# Patient Record
Sex: FEMALE | Race: WHITE | NOT HISPANIC OR LATINO | Employment: FULL TIME | URBAN - METROPOLITAN AREA
[De-identification: names, ages, dates, MRNs, and addresses within clinical notes are randomized per-mention and may not be internally consistent; named-entity substitution may affect disease eponyms.]

---
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
## 2020-02-21 ENCOUNTER — TELEPHONE (OUTPATIENT)
Dept: OBGYN | Facility: CLINIC | Age: 20
End: 2020-02-21

## 2020-02-21 DIAGNOSIS — N94.6 DYSMENORRHEA: ICD-10-CM

## 2020-02-21 RX ORDER — NORETHINDRONE ACETATE AND ETHINYL ESTRADIOL 1.5-30(21)
KIT ORAL
Qty: 112 TABLET | Refills: 0 | OUTPATIENT
Start: 2020-02-21

## 2020-02-21 NOTE — TELEPHONE ENCOUNTER
"Requested Prescriptions   Pending Prescriptions Disp Refills     norethindrone-ethinyl estradiol-iron (JUNEL FE 1.5/30) 1.5-30 MG-MCG PO tablet [Pharmacy Med Name: JUNEL FE 1.5 MG-30 MCG TABLET] 112 tablet 0     Sig: TAKE 1 TABLET BY MOUTH ONCE DAILY IN A CONTINUOUS FASHION       Contraceptives Protocol Passed - 2/21/2020  3:27 PM        Passed - Patient is not a current smoker if age is 35 or older        Passed - Recent (12 mo) or future (30 days) visit within the authorizing provider's specialty     Patient has had an office visit with the authorizing provider or a provider within the authorizing providers department within the previous 12 mos or has a future within next 30 days. See \"Patient Info\" tab in inbasket, or \"Choose Columns\" in Meds & Orders section of the refill encounter.              Passed - Medication is active on med list        Passed - No active pregnancy on record        Passed - No positive pregnancy test in past 12 months        Refills available  Noreen Castro RN on 2/21/2020 at 3:36 PM    "

## 2020-02-21 NOTE — TELEPHONE ENCOUNTER
Patient called with questions regarding her birth control medication refill, says it did not go through to the pharmacy.

## 2020-02-24 NOTE — TELEPHONE ENCOUNTER
She said that the pharmacy told her she did not have refills.  Will call and clarify and she should be able to pick them up today.  Pt verbalized understanding, in agreement with plan, and voiced no further questions.   Pharmacy called.  She has refills available.  Tayla Sanchez RN on 2/24/2020 at 11:36 AM

## 2020-03-25 DIAGNOSIS — N94.6 DYSMENORRHEA: ICD-10-CM

## 2020-03-25 RX ORDER — NORETHINDRONE ACETATE AND ETHINYL ESTRADIOL 1.5-30(21)
KIT ORAL
Qty: 112 TABLET | Refills: 2 | Status: SHIPPED | OUTPATIENT
Start: 2020-03-25 | End: 2020-12-22

## 2020-03-25 NOTE — TELEPHONE ENCOUNTER
"Requested Prescriptions   Pending Prescriptions Disp Refills     norethindrone-ethinyl estradiol-iron (JUNEL FE 1.5/30) 1.5-30 MG-MCG tablet 112 tablet 0     Sig: TAKE 1 TABLET BY MOUTH ONCE DAILY IN A CONTINUOUS FASHION       Contraceptives Protocol Passed - 3/25/2020 12:19 PM        Passed - Patient is not a current smoker if age is 35 or older        Passed - Recent (12 mo) or future (30 days) visit within the authorizing provider's specialty     Patient has had an office visit with the authorizing provider or a provider within the authorizing providers department within the previous 12 mos or has a future within next 30 days. See \"Patient Info\" tab in inbasket, or \"Choose Columns\" in Meds & Orders section of the refill encounter.              Passed - Medication is active on med list        Passed - No active pregnancy on record        Passed - No positive pregnancy test in past 12 months           Last Written Prescription Date:  11/26/19  Last Fill Quantity: 112,  # refills: 3   Last office visit: 11/26/2019 with prescribing provider:  hector     Requested from different pharmacy.  Prescription approved per [facility] Refill Protocol.  Tayla Sanchez RN on 3/25/2020 at 12:45 PM      "

## 2020-12-21 NOTE — PROGRESS NOTES
Shena is a 20 year old  female who presents for annual exam.     Besides routine health maintenance, she has no other health concerns today .    HPI:  The patient's PCP is [facility].   Refill her continuous ocp  College in [place]  Online this years due to covid  Loves not having periods  Uses condoms also  Had hpv vaccine she said  Had covid already about 3 months ago  First pap done today      GYNECOLOGIC HISTORY:    Patient's last menstrual period was 2020 (exact date).    Regular menses? On continuous birth control, menses every 3 months      Her current contraception method is: oral contraceptives.  She  reports that she has never smoked. She has never used smokeless tobacco.    Patient is sexually active.  STD testing offered?  Declined  Last PHQ-9 score on record =   PHQ-9 SCORE 2020   PHQ-9 Total Score 0     Last GAD7 score on record =   ISABELLE-7 SCORE 2020   Total Score 1     Alcohol Score = 4    HEALTH MAINTENANCE:  Cholesterol: (No results found for: CHOL   Last Mammo: Not applicable, Result: Not applicable, Next Mammo: Due at age 40   Pap: (No results found for: PAP )  Colonoscopy:  NA, Result: Not applicable, Next Colonoscopy: NA years.  Dexa:  NA    Health maintenance updated:  yes    HISTORY:  OB History    Para Term  AB Living   0 0 0 0 0 0   SAB TAB Ectopic Multiple Live Births   0 0 0 0 0       Patient Active Problem List   Diagnosis     Bradycardia     Past Surgical History:   Procedure Laterality Date     HC TOOTH EXTRACTION W/FORCEP        Social History     Tobacco Use     Smoking status: Never Smoker     Smokeless tobacco: Never Used   Substance Use Topics     Alcohol use: Yes     Alcohol/week: 0.0 standard drinks     Comment: Occas       Problem (# of Occurrences) Relation (Name,Age of Onset)    No Known Problems (8) Mother, Father, Sister, Brother, Maternal Grandmother, Maternal Grandfather, Paternal Grandmother, Other         "    Current Outpatient Medications   Medication Sig     fluticasone-vilanterol (BREO ELLIPTA) 200-25 MCG/INH inhaler Inhale 1 puff into the lungs daily     montelukast (SINGULAIR) 10 MG tablet      norethindrone-ethinyl estradiol-iron (JUNEL FE 1.5/30) 1.5-30 MG-MCG tablet TAKE 1 TABLET BY MOUTH ONCE DAILY IN A CONTINUOUS FASHION     No current facility-administered medications for this visit.      No Known Allergies    Past medical, surgical, social and family histories were reviewed and updated in EPIC.    ROS:   12 point review of systems negative other than symptoms noted below or in the HPI.  No urinary frequency or dysuria, bladder or kidney problems    EXAM:  /60   Ht 1.708 m (5' 7.25\")   Wt 62.1 kg (137 lb)   LMP 12/09/2020 (Exact Date)   Breastfeeding No   BMI 21.30 kg/m     BMI: Body mass index is 21.3 kg/m .    PHYSICAL EXAM:  Constitutional:   Appearance: Well nourished, well developed, alert, in no acute distress  Neck:  Lymph Nodes:  No lymphadenopathy present    Thyroid:  Gland size normal, nontender, no nodules or masses present  on palpation    Breasts: Inspection of Breasts:  No lymphadenopathy present., Palpation of Breasts and Axillae:  No masses present on palpation, no breast tenderness., Axillary Lymph Nodes:  No lymphadenopathy present. and No nodularity, asymmetry or nipple discharge bilaterally.  Gastrointestinal:   Abdominal Examination:  Abdomen nontender to palpation, tone normal without rigidity or guarding, no masses present, umbilicus without lesions   Liver and Spleen:  No hepatomegaly present, liver nontender to palpation    Hernias:  No hernias present  Lymphatic: Lymph Nodes:  No other lymphadenopathy present  Skin:  General Inspection:  No rashes present, no lesions present, no areas of  discoloration  Neurologic:    Mental Status:  Oriented X3.  Normal strength and tone, sensory exam                grossly normal, mentation intact and speech normal.    Psychiatric:   " Mentation appears normal and affect normal/bright.         Pelvic Exam:  External Genitalia:     Normal appearance for age, no discharge present, no tenderness present, no inflammatory lesions present, color normal  Vagina:     Normal vaginal vault without central or paravaginal defects, no discharge present, no inflammatory lesions present, no masses present  Bladder:     Nontender to palpation  Urethra:   Urethral Body:  Urethra palpation normal, urethra structural support normal   Urethral Meatus:  No erythema or lesions present  Cervix:     Appearance healthy, no lesions present, nontender to palpation, no bleeding present  Uterus:     Uterus: firm, normal sized and nontender, midplane in position.   Adnexa:     No adnexal tenderness present, no adnexal masses present  Perineum:     Perineum within normal limits, no evidence of trauma, no rashes or skin lesions present  Anus:     Anus within normal limits, no hemorrhoids present  Inguinal Lymph Nodes:     No lymphadenopathy present  Pubic Hair:     Normal pubic hair distribution for age  Genitalia and Groin:     No rashes present, no lesions present, no areas of discoloration, no masses present      COUNSELING:   Reviewed preventive health counseling, as reflected in patient instructions       Safe sex practices/STD prevention    BMI: Body mass index is 21.3 kg/m .      ASSESSMENT:  20 year old female with satisfactory annual exam.    ICD-10-CM    1. Encounter for gynecological examination without abnormal finding  Z01.419 Pap imaged thin layer screen only - recommended age 21 - 24 years   2. Dysmenorrhea  N94.6 norethindrone-ethinyl estradiol-iron (JUNEL FE 1.5/30) 1.5-30 MG-MCG tablet       PLAN:  Refilled ocp  Discussed pap guidelines  Discussed vit D as a good supplement   Repeat pap 3 yrs if normal    Zeenat Gerardo MD

## 2020-12-22 ENCOUNTER — OFFICE VISIT (OUTPATIENT)
Dept: OBGYN | Facility: CLINIC | Age: 20
End: 2020-12-22
Payer: COMMERCIAL

## 2020-12-22 VITALS
SYSTOLIC BLOOD PRESSURE: 120 MMHG | BODY MASS INDEX: 21.5 KG/M2 | HEIGHT: 67 IN | WEIGHT: 137 LBS | DIASTOLIC BLOOD PRESSURE: 60 MMHG

## 2020-12-22 DIAGNOSIS — Z01.419 ENCOUNTER FOR GYNECOLOGICAL EXAMINATION WITHOUT ABNORMAL FINDING: Primary | ICD-10-CM

## 2020-12-22 DIAGNOSIS — N94.6 DYSMENORRHEA: ICD-10-CM

## 2020-12-22 PROCEDURE — 99395 PREV VISIT EST AGE 18-39: CPT | Performed by: OBSTETRICS & GYNECOLOGY

## 2020-12-22 PROCEDURE — G0145 SCR C/V CYTO,THINLAYER,RESCR: HCPCS | Performed by: OBSTETRICS & GYNECOLOGY

## 2020-12-22 RX ORDER — NORETHINDRONE ACETATE AND ETHINYL ESTRADIOL 1.5-30(21)
KIT ORAL
Qty: 112 TABLET | Refills: 4 | Status: SHIPPED | OUTPATIENT
Start: 2020-12-22 | End: 2021-02-15

## 2020-12-22 ASSESSMENT — ANXIETY QUESTIONNAIRES
5. BEING SO RESTLESS THAT IT IS HARD TO SIT STILL: NOT AT ALL
3. WORRYING TOO MUCH ABOUT DIFFERENT THINGS: SEVERAL DAYS
IF YOU CHECKED OFF ANY PROBLEMS ON THIS QUESTIONNAIRE, HOW DIFFICULT HAVE THESE PROBLEMS MADE IT FOR YOU TO DO YOUR WORK, TAKE CARE OF THINGS AT HOME, OR GET ALONG WITH OTHER PEOPLE: NOT DIFFICULT AT ALL
7. FEELING AFRAID AS IF SOMETHING AWFUL MIGHT HAPPEN: NOT AT ALL
1. FEELING NERVOUS, ANXIOUS, OR ON EDGE: NOT AT ALL
GAD7 TOTAL SCORE: 1
6. BECOMING EASILY ANNOYED OR IRRITABLE: NOT AT ALL
2. NOT BEING ABLE TO STOP OR CONTROL WORRYING: NOT AT ALL

## 2020-12-22 ASSESSMENT — MIFFLIN-ST. JEOR: SCORE: 1428.02

## 2020-12-22 ASSESSMENT — PATIENT HEALTH QUESTIONNAIRE - PHQ9
SUM OF ALL RESPONSES TO PHQ QUESTIONS 1-9: 0
5. POOR APPETITE OR OVEREATING: NOT AT ALL

## 2020-12-23 ASSESSMENT — ANXIETY QUESTIONNAIRES: GAD7 TOTAL SCORE: 1

## 2020-12-28 LAB
COPATH REPORT: NORMAL
PAP: NORMAL

## 2021-01-15 ENCOUNTER — HEALTH MAINTENANCE LETTER (OUTPATIENT)
Age: 21
End: 2021-01-15

## 2021-02-15 DIAGNOSIS — N94.6 DYSMENORRHEA: ICD-10-CM

## 2021-02-15 RX ORDER — NORETHINDRONE ACETATE AND ETHINYL ESTRADIOL 1.5-30(21)
KIT ORAL
Qty: 112 TABLET | Refills: 4 | Status: SHIPPED | OUTPATIENT
Start: 2021-02-15 | End: 2022-01-14

## 2021-02-15 NOTE — TELEPHONE ENCOUNTER
"Requested Prescriptions   Pending Prescriptions Disp Refills     norethindrone-ethinyl estradiol-iron (JUNEL FE 1.5/30) 1.5-30 MG-MCG tablet 112 tablet 4     Sig: TAKE 1 TABLET BY MOUTH ONCE DAILY IN A CONTINUOUS FASHION       Contraceptives Protocol Passed - 2/15/2021  9:44 AM        Passed - Patient is not a current smoker if age is 35 or older        Passed - Recent (12 mo) or future (30 days) visit within the authorizing provider's specialty     Patient has had an office visit with the authorizing provider or a provider within the authorizing providers department within the previous 12 mos or has a future within next 30 days. See \"Patient Info\" tab in inbasket, or \"Choose Columns\" in Meds & Orders section of the refill encounter.              Passed - Medication is active on med list        Passed - No active pregnancy on record        Passed - No positive pregnancy test in past 12 months           Last Written Prescription Date:  12/22/20  Last Fill Quantity: 112,  # refills: 4   Last office visit: 12/22/2020 with prescribing provider:  Akin   Future Office Visit:  None found    Rx sent to preferred pharmacy  Noreen Castro RN on 2/15/2021 at 9:51 AM        "

## 2021-09-18 ENCOUNTER — HEALTH MAINTENANCE LETTER (OUTPATIENT)
Age: 21
End: 2021-09-18

## 2022-01-14 ENCOUNTER — TELEPHONE (OUTPATIENT)
Dept: OBGYN | Facility: CLINIC | Age: 22
End: 2022-01-14
Payer: COMMERCIAL

## 2022-01-14 DIAGNOSIS — N94.6 DYSMENORRHEA: ICD-10-CM

## 2022-01-14 RX ORDER — NORETHINDRONE ACETATE AND ETHINYL ESTRADIOL 1.5-30(21)
KIT ORAL
Qty: 112 TABLET | Refills: 1 | Status: SHIPPED | OUTPATIENT
Start: 2022-01-14 | End: 2022-05-13

## 2022-01-14 NOTE — TELEPHONE ENCOUNTER
Requested Prescriptions   Signed Prescriptions Disp Refills    norethindrone-ethinyl estradiol-iron (JUNEL FE 1.5/30) 1.5-30 MG-MCG tablet 112 tablet 1     Sig: TAKE 1 TABLET BY MOUTH ONCE DAILY IN A CONTINUOUS FASHION       There is no refill protocol information for this order        Last Written Prescription Date:  2/7/2021  Last Fill Quantity: 112,  # refills: 4   Last office visit: 12/22/2020 with prescribing provider:  Dr Gerardo   Future Office Visit: with Dr levy 5/13/2022    Prescription approved per [facility] Refill Protocol.  Anabell Mckeon RN on 1/14/2022 at 3:06 PM

## 2022-03-05 ENCOUNTER — HEALTH MAINTENANCE LETTER (OUTPATIENT)
Age: 22
End: 2022-03-05

## 2022-05-13 ENCOUNTER — OFFICE VISIT (OUTPATIENT)
Dept: OBGYN | Facility: CLINIC | Age: 22
End: 2022-05-13
Payer: COMMERCIAL

## 2022-05-13 VITALS
BODY MASS INDEX: 22.98 KG/M2 | SYSTOLIC BLOOD PRESSURE: 122 MMHG | WEIGHT: 143 LBS | DIASTOLIC BLOOD PRESSURE: 62 MMHG | HEIGHT: 66 IN

## 2022-05-13 DIAGNOSIS — Z01.419 ENCOUNTER FOR GYNECOLOGICAL EXAMINATION WITHOUT ABNORMAL FINDING: Primary | ICD-10-CM

## 2022-05-13 DIAGNOSIS — N94.6 DYSMENORRHEA: ICD-10-CM

## 2022-05-13 PROCEDURE — 99395 PREV VISIT EST AGE 18-39: CPT | Performed by: NURSE PRACTITIONER

## 2022-05-13 RX ORDER — NORETHINDRONE ACETATE AND ETHINYL ESTRADIOL 1.5-30(21)
KIT ORAL
Qty: 112 TABLET | Refills: 3 | Status: SHIPPED | OUTPATIENT
Start: 2022-05-13 | End: 2023-05-15

## 2022-05-13 NOTE — PROGRESS NOTES
Shena is a 22 year old  female who presents for annual exam.     Besides routine health maintenance, she has no other health concerns today .    HPI:  The patient's PCP is  [facility].  Patient here today for her annual GYN exam and birth control refill.  She is feeling well and has no GYN complaints.  She had a Pap smear that was negative in .  She is sexually active with 1 male partner and declines STD testing at this time.    She takes her contraceptive tablets in a continuous fashion and is amenorrheic on the method.      GYNECOLOGIC HISTORY:    No LMP recorded. (Menstrual status: Birth Control).  Regular menses? no  Her current contraception method is: oral contraceptives.  She  reports that she has never smoked. She has never used smokeless tobacco.  Patient is sexually active.  STD testing offered?  yes    Last PHQ-9 score on record =   PHQ-9 SCORE 2020   PHQ-9 Total Score 0     Last GAD7 score on record =   ISABELLE-7 SCORE 2020   Total Score 1     Alcohol Score =     HEALTH MAINTENANCE:  Cholesterol: (No results found for: CHOL   Last Mammo: N/A, Result: not applicable, Next Mammo: screen age 40  Pap:   Lab Results   Component Value Date    PAP NIL 2020   Colonoscopy:  N/A, Result: not applicable, Next Colonoscopy: screen age 45  Dexa: N/A  Health maintenance updated:  yes    HISTORY:  OB History    Para Term  AB Living   0 0 0 0 0 0   SAB IAB Ectopic Multiple Live Births   0 0 0 0 0       Patient Active Problem List   Diagnosis     Bradycardia     Past Surgical History:   Procedure Laterality Date     HC TOOTH EXTRACTION W/FORCEP        Social History     Tobacco Use     Smoking status: Never Smoker     Smokeless tobacco: Never Used   Substance Use Topics     Alcohol use: Yes     Alcohol/week: 0.0 standard drinks     Comment: Occas       Problem (# of Occurrences) Relation (Name,Age of Onset)    No Known Problems (8) Mother, Father, Sister,  "Brother, Maternal Grandmother, Maternal Grandfather, Paternal Grandmother, Other            Current Outpatient Medications   Medication Sig     fluticasone-vilanterol (BREO ELLIPTA) 200-25 MCG/INH inhaler Inhale 1 puff into the lungs daily     montelukast (SINGULAIR) 10 MG tablet      norethindrone-ethinyl estradiol-iron (JUNEL FE 1.5/30) 1.5-30 MG-MCG tablet TAKE 1 TABLET BY MOUTH ONCE DAILY IN A CONTINUOUS FASHION     No current facility-administered medications for this visit.     No Known Allergies    Past medical, surgical, social and family histories were reviewed and updated in EPIC.    ROS:   12 point review of systems negative other than symptoms noted below or in the HPI.  No urinary frequency or dysuria, bladder or kidney problems    EXAM:  /62   Ht 1.676 m (5' 6\")   Wt 64.9 kg (143 lb)   BMI 23.08 kg/m     BMI: Body mass index is 23.08 kg/m .    PHYSICAL EXAM:  Constitutional:   Appearance: Well nourished, well developed, alert, in no acute distress  Neck:  Lymph Nodes:  No lymphadenopathy present    Thyroid:  Gland size normal, nontender, no nodules or masses present  on palpation  Chest:  Respiratory Effort:  Breathing unlabored  Cardiovascular:    Heart: Auscultation:  Regular rate, normal rhythm, no murmurs present  Breasts: Inspection of Breasts:  No lymphadenopathy present., Palpation of Breasts and Axillae:  No masses present on palpation, no breast tenderness., Axillary Lymph Nodes:  No lymphadenopathy present. and No nodularity, asymmetry or nipple discharge bilaterally.  Gastrointestinal:   Abdominal Examination:  Abdomen nontender to palpation, tone normal without rigidity or guarding, no masses present, umbilicus without lesions   Liver and Spleen:  No hepatomegaly present, liver nontender to palpation    Hernias:  No hernias present  Lymphatic: Lymph Nodes:  No other lymphadenopathy present  Skin:  General Inspection:  No rashes present, no lesions present, no areas of "  discoloration  Neurologic:    Mental Status:  Oriented X3.  Normal strength and tone, sensory exam                grossly normal, mentation intact and speech normal.    Psychiatric:   Mentation appears normal and affect normal/bright.         Pelvic Exam:  External Genitalia:     Normal appearance for age, no discharge present, no tenderness present, no inflammatory lesions present, color normal  Vagina:     Normal vaginal vault without central or paravaginal defects, no discharge present, no inflammatory lesions present, no masses present  Bladder:     Nontender to palpation  Urethra:   Urethral Body:  Urethra palpation normal, urethra structural support normal   Urethral Meatus:  No erythema or lesions present  Cervix:     Appearance healthy, no lesions present, nontender to palpation, no bleeding present  Uterus:     Uterus: firm, normal sized and nontender, anteverted in position.   Adnexa:     No adnexal tenderness present, no adnexal masses present  Perineum:     Perineum within normal limits, no evidence of trauma, no rashes or skin lesions present  Anus:     Anus within normal limits, no hemorrhoids present  Inguinal Lymph Nodes:     No lymphadenopathy present  Pubic Hair:     Normal pubic hair distribution for age  Genitalia and Groin:     No rashes present, no lesions present, no areas of discoloration, no masses present      COUNSELING:   Special attention given to:        Regular exercise       Healthy diet/nutrition       Contraception    BMI: Body mass index is 23.08 kg/m .      ASSESSMENT:  22 year old female with satisfactory annual exam.    ICD-10-CM    1. Encounter for gynecological examination without abnormal finding  Z01.419    2. Dysmenorrhea  N94.6 norethindrone-ethinyl estradiol-iron (JUNEL FE 1.5/30) 1.5-30 MG-MCG tablet       PLAN:  22-year-old female with a normal GYN exam.  Pap smear is up-to-date.  She is okay to continue on her oral contraceptive tablets for 1 year using them  continuously.  Pap smear will be due next year.    JUAN Johnson CNP

## 2022-06-24 ENCOUNTER — TRANSFERRED RECORDS (OUTPATIENT)
Dept: HEALTH INFORMATION MANAGEMENT | Facility: CLINIC | Age: 22
End: 2022-06-24

## 2022-07-01 DIAGNOSIS — N94.6 DYSMENORRHEA: ICD-10-CM

## 2022-07-05 NOTE — TELEPHONE ENCOUNTER
"Requested Prescriptions   Pending Prescriptions Disp Refills     norethindrone-ethinyl estradiol-iron (JUNEL FE 1.5/30) 1.5-30 MG-MCG tablet [Pharmacy Med Name: JUNEL FE 1.5 MG-30 MCG TABLET] 112 tablet 1     Sig: TAKE 1 TABLET BY MOUTH ONCE DAILY IN A CONTINUOUS FASHION       Contraceptives Protocol Passed - 7/1/2022 11:08 PM        Passed - Patient is not a current smoker if age is 35 or older        Passed - Recent (12 mo) or future (30 days) visit within the authorizing provider's specialty     Patient has had an office visit with the authorizing provider or a provider within the authorizing providers department within the previous 12 mos or has a future within next 30 days. See \"Patient Info\" tab in inbasket, or \"Choose Columns\" in Meds & Orders section of the refill encounter.              Passed - Medication is active on med list        Passed - No active pregnancy on record        Passed - No positive pregnancy test in past 12 months         Last Written Prescription Date:  5/13/22  Last Fill Quantity: 112,  # refills: 3    Last office visit: 5/13/2022 with prescribing provider:  Anabell Montoya NP -    PLAN:  22-year-old female with a normal GYN exam.  Pap smear is up-to-date.  She is okay to continue on her oral contraceptive tablets for 1 year using them continuously.  Pap smear will be due next year.  Future Office Visit:  NONE    Called and left detailed vm - request came from [place] [place] [facility] - original rx at [facility] locally - need to confirm plan or can tx Rx to [facility]     Anabell Mckeon RN on 7/5/2022 at 9:01 AM          rx closed. Patient to contact clinic regarding prescription.    Sylwia Nicholas RN on 7/18/2022 at 9:22 AM      "

## 2022-07-18 RX ORDER — NORETHINDRONE ACETATE AND ETHINYL ESTRADIOL 1.5-30(21)
KIT ORAL
Qty: 112 TABLET | Refills: 1 | OUTPATIENT
Start: 2022-07-18

## 2022-11-19 ENCOUNTER — HEALTH MAINTENANCE LETTER (OUTPATIENT)
Age: 22
End: 2022-11-19

## 2023-05-15 ENCOUNTER — TELEPHONE (OUTPATIENT)
Dept: OBGYN | Facility: CLINIC | Age: 23
End: 2023-05-15
Payer: COMMERCIAL

## 2023-05-15 DIAGNOSIS — N94.6 DYSMENORRHEA: ICD-10-CM

## 2023-05-15 NOTE — TELEPHONE ENCOUNTER
RN cannot refill per protocol.    Routing to Anabell Montoya NP to approve one month refill  Noreen Castro RN on 5/15/2023 at 2:53 PM

## 2023-05-15 NOTE — TELEPHONE ENCOUNTER
Reason for call:  Medication      If this is a refill request, has the caller requested the refill from the pharmacy already?     Pt due for annual, no refills available  Pt has scheduled annual and req refill until her appt. Pt lives in [place] and will be returning the week of 7/3 - Anabell is out of the office, pt will be seeing Boo.       Will the patient be using a [facility]? No     Name of the pharmacy and phone number for the current request:     [facility]/pharmacy #[id number] - [place], [address]  [phone]    Name of the medication requested: norethindrone-ethinyl estradiol-iron (JUNEL FE 1.5/30) 1.5-30 MG-MCG tablet    Phone number to reach patient:  [phone]    Best Time:  Anytime    Can we leave a detailed message on this number?  YES    Travel screening: Not Applicable

## 2023-05-16 RX ORDER — NORETHINDRONE ACETATE AND ETHINYL ESTRADIOL 1.5-30(21)
KIT ORAL
Qty: 112 TABLET | Refills: 0 | Status: SHIPPED | OUTPATIENT
Start: 2023-05-16 | End: 2023-07-06

## 2023-07-02 ENCOUNTER — HEALTH MAINTENANCE LETTER (OUTPATIENT)
Age: 23
End: 2023-07-02

## 2023-07-06 ENCOUNTER — OFFICE VISIT (OUTPATIENT)
Dept: MIDWIFE SERVICES | Facility: CLINIC | Age: 23
End: 2023-07-06
Payer: COMMERCIAL

## 2023-07-06 VITALS
BODY MASS INDEX: 22.85 KG/M2 | DIASTOLIC BLOOD PRESSURE: 62 MMHG | SYSTOLIC BLOOD PRESSURE: 118 MMHG | HEIGHT: 67 IN | WEIGHT: 145.6 LBS

## 2023-07-06 DIAGNOSIS — Z12.4 PAP SMEAR FOR CERVICAL CANCER SCREENING: ICD-10-CM

## 2023-07-06 DIAGNOSIS — Z01.419 WOMEN'S ANNUAL ROUTINE GYNECOLOGICAL EXAMINATION: Primary | ICD-10-CM

## 2023-07-06 DIAGNOSIS — Z30.41 ENCOUNTER FOR SURVEILLANCE OF CONTRACEPTIVE PILLS: ICD-10-CM

## 2023-07-06 PROBLEM — R00.1 BRADYCARDIA: Status: RESOLVED | Noted: 2017-04-12 | Resolved: 2023-07-06

## 2023-07-06 PROCEDURE — G0145 SCR C/V CYTO,THINLAYER,RESCR: HCPCS | Performed by: ADVANCED PRACTICE MIDWIFE

## 2023-07-06 PROCEDURE — 99395 PREV VISIT EST AGE 18-39: CPT | Performed by: ADVANCED PRACTICE MIDWIFE

## 2023-07-06 RX ORDER — NORETHINDRONE ACETATE AND ETHINYL ESTRADIOL 1.5-30(21)
KIT ORAL
Qty: 112 TABLET | Refills: 3 | Status: SHIPPED | OUTPATIENT
Start: 2023-07-06 | End: 2024-06-05

## 2023-07-06 ASSESSMENT — ANXIETY QUESTIONNAIRES
7. FEELING AFRAID AS IF SOMETHING AWFUL MIGHT HAPPEN: NOT AT ALL
1. FEELING NERVOUS, ANXIOUS, OR ON EDGE: NOT AT ALL
2. NOT BEING ABLE TO STOP OR CONTROL WORRYING: NOT AT ALL
5. BEING SO RESTLESS THAT IT IS HARD TO SIT STILL: NOT AT ALL
GAD7 TOTAL SCORE: 0
GAD7 TOTAL SCORE: 0
IF YOU CHECKED OFF ANY PROBLEMS ON THIS QUESTIONNAIRE, HOW DIFFICULT HAVE THESE PROBLEMS MADE IT FOR YOU TO DO YOUR WORK, TAKE CARE OF THINGS AT HOME, OR GET ALONG WITH OTHER PEOPLE: NOT DIFFICULT AT ALL
3. WORRYING TOO MUCH ABOUT DIFFERENT THINGS: NOT AT ALL
6. BECOMING EASILY ANNOYED OR IRRITABLE: NOT AT ALL

## 2023-07-06 ASSESSMENT — PATIENT HEALTH QUESTIONNAIRE - PHQ9
SUM OF ALL RESPONSES TO PHQ QUESTIONS 1-9: 0
5. POOR APPETITE OR OVEREATING: NOT AT ALL

## 2023-07-06 NOTE — PROGRESS NOTES
Shena is a 23 year old  female who presents for annual exam.     Besides routine health maintenance, she has no other health concerns today .    HPI:  Shena here today for her annual GYN exam and birth control refill. She is feeling well and has no GYN complaints.  She had a Pap smear that was negative in . Due for on by 23.    She was previously sexually active with 1 male partner (not at this time) and declines STD testing at this time.     She takes her contraceptive tablets in a continuous fashion and is amenorrheic on the method. When she does get a period it is normal with a light-medium amount of bleeding.       GYNECOLOGIC HISTORY:    23. (Menstrual status: Birth Control).      Her current contraception method is: oral contraceptives.  She  reports that she has never smoked. She has never used smokeless tobacco.    Patient is sexually active.  STD testing offered?  Declined  Last PHQ-9 score on record =       2023     8:10 AM   PHQ-9 SCORE   PHQ-9 Total Score 0     Last GAD7 score on record =       2023     8:10 AM   ISABELLE-7 SCORE   Total Score 0     Alcohol Score = 3    HEALTH MAINTENANCE:    Pap:   Lab Results   Component Value Date    PAP NIL 2020       Health maintenance updated:  yes    HISTORY:  OB History    Para Term  AB Living   0 0 0 0 0 0   SAB IAB Ectopic Multiple Live Births   0 0 0 0 0       Patient Active Problem List   Diagnosis     Bradycardia     Past Surgical History:   Procedure Laterality Date     HC TOOTH EXTRACTION W/FORCEP  2017      Social History     Tobacco Use     Smoking status: Never     Smokeless tobacco: Never   Substance Use Topics     Alcohol use: Yes     Alcohol/week: 0.0 standard drinks of alcohol     Comment: Occas       Problem (# of Occurrences) Relation (Name,Age of Onset)    No Known Problems (8) Mother, Father, Sister, Brother, Maternal Grandmother, Maternal Grandfather, Paternal Grandmother, Other            Current  "Outpatient Medications   Medication Sig     fluticasone-vilanterol (BREO ELLIPTA) 200-25 MCG/INH inhaler Inhale 1 puff into the lungs daily     montelukast (SINGULAIR) 10 MG tablet      norethindrone-ethinyl estradiol-iron (JUNEL FE 1.5/30) 1.5-30 MG-MCG tablet TAKE 1 TABLET BY MOUTH ONCE DAILY IN A CONTINUOUS FASHION     No current facility-administered medications for this visit.     No Known Allergies    Past medical, surgical, social and family histories were reviewed and updated in EPIC.     ROS:   12 point review of systems negative other than symptoms noted below or in the HPI.  No urinary frequency or dysuria, bladder or kidney problems    EXAM:  /62   Ht 1.697 m (5' 6.8\")   Wt 66 kg (145 lb 9.6 oz)   BMI 22.94 kg/m     BMI: Body mass index is 22.94 kg/m .    PHYSICAL EXAM:  Constitutional:   Appearance: Well nourished, well developed, alert, in no acute distress  Neck:  Lymph Nodes:  No lymphadenopathy present    Thyroid:  Gland size normal, nontender, no nodules or masses present  on palpation  Chest:  Respiratory Effort:  Breathing unlabored  Cardiovascular:    Heart: Auscultation:  Regular rate, normal rhythm, no murmurs present  Breasts: Inspection of Breasts:  No lymphadenopathy present., Palpation of Breasts and Axillae:  No masses present on palpation, no breast tenderness., Axillary Lymph Nodes:  No lymphadenopathy present. and No nodularity, asymmetry or nipple discharge bilaterally.  Gastrointestinal:   Abdominal Examination:  Abdomen nontender to palpation, tone normal without rigidity or guarding, no masses present, umbilicus without lesions   Liver and Spleen:  No hepatomegaly present, liver nontender to palpation    Hernias:  No hernias present  Lymphatic: Lymph Nodes:  No other lymphadenopathy present  Skin:  General Inspection:  No rashes present, no lesions present, no areas of  discoloration  Neurologic:    Mental Status:  Oriented X3.  Normal strength and tone, sensory exam       "          grossly normal, mentation intact and speech normal.    Psychiatric:   Mentation appears normal and affect normal/bright.         Pelvic Exam:  External Genitalia:     Normal appearance for age, no discharge present, no tenderness present, no inflammatory lesions present, color normal  Vagina:     Normal vaginal vault without central or paravaginal defects, no discharge present, no inflammatory lesions present, no masses present  Bladder:     Nontender to palpation  Urethra:   Urethral Body:  Urethra palpation normal, urethra structural support normal   Urethral Meatus:  No erythema or lesions present  Cervix:     Appearance healthy, no lesions present, nontender to palpation, no bleeding present  Uterus:     Uterus: firm, normal sized and nontender, midplane in position.   Adnexa:     No adnexal tenderness present, no adnexal masses present  Perineum:     Perineum within normal limits, no evidence of trauma, no rashes or skin lesions present  Anus:     Anus within normal limits, no hemorrhoids present  Inguinal Lymph Nodes:     No lymphadenopathy present  Pubic Hair:     Normal pubic hair distribution for age  Genitalia and Groin:     No rashes present, no lesions present, no areas of discoloration, no masses present      COUNSELING:   Reviewed preventive health counseling, as reflected in patient instructions       Regular exercise       Healthy diet/nutrition       Contraception    BMI: Body mass index is 22.94 kg/m .      ASSESSMENT:  23 year old female with satisfactory annual exam.    ICD-10-CM    1. Women's annual routine gynecological examination  Z01.419       2. Pap smear for cervical cancer screening  Z12.4 Pap thin layer screen only - recommended age 21 - 24 years      3. Encounter for surveillance of contraceptive pills  Z30.41 norethindrone-ethinyl estradiol-iron (JUNEL FE 1.5/30) 1.5-30 MG-MCG tablet          PLAN:  Normal exam completed today.   Pap completed today.  OCPs to be  continued.    Return to clinic in 1 year for annual exam.     Mckenzie Haynes, LOYDA  [facility]    Provider Disclosure:  I was present with the [facility] student who participated in the service and in the documentation of the services provided. I have verified the history and personally performed the physical exam and medical decision-making, as documented by the student and edited by me.     JUAN Remy [facility]  [phone]

## 2023-07-10 LAB
BKR LAB AP GYN ADEQUACY: NORMAL
BKR LAB AP GYN INTERPRETATION: NORMAL
BKR LAB AP HPV REFLEX: NO
BKR LAB AP PREVIOUS ABNORMAL: NORMAL
PATH REPORT.COMMENTS IMP SPEC: NORMAL
PATH REPORT.COMMENTS IMP SPEC: NORMAL
PATH REPORT.RELEVANT HX SPEC: NORMAL

## 2024-06-05 NOTE — PROGRESS NOTES
"Shena is a 24 year old  female who presents for annual exam.     Besides routine health maintenance, she has no other health concerns today .    HPI:  Shena presents today for annual gynecologic exam. She has been very healthy over the past year,  no new health conditions.     She is using Junel Fe OCPs for contraception. Using continuously, originally had some spotting at end of month with first months of use, now without any breakthrough bleeding.     Sexually active with male partner. Requests STI testing today.     No other questions or concerns today.     GYNECOLOGIC HISTORY:    Patient's last menstrual period was 2024 (approximate).    Regular menses? yes  Menses every 30 days.  Length of menses: 2 days    Her current contraception method is: oral contraceptives.  She  reports that she has never smoked. She has never used smokeless tobacco.    Patient is sexually active.  STD testing offered?  Accepted  Last PHQ-9 score on record =       2024     2:09 PM   PHQ-9 SCORE   PHQ-9 Total Score 0     Last GAD7 score on record =       2024     2:09 PM   ISABELLE-7 SCORE   Total Score 0     Alcohol Score = 2    HEALTH MAINTENANCE:  Cholesterol: (No results found for: \"CHOL\"   Last Mammo: Due at age 40  Pap:   Lab Results   Component Value Date    GYNINTERP  2023     Negative for Intraepithelial Lesion or Malignancy (NILM)    PAP NIL 2020     Colonoscopy:  Due at age 45.  Dexa:  Due at age 65    Health maintenance updated:  yes    Overdue          Never done CHLAMYDIA SCREENING (Yearly)     Never done ADVANCE CARE PLANNING (Every 5 Years)     Never done HIV SCREENING (Once)     Never done HEPATITIS C SCREENING (Once)     2021 DTAP/TDAP/TD IMMUNIZATION (7 - Td or Tdap)   Last completed: 2011     Never done COVID-19 Vaccine (2023- season)     2024 PHQ-2 (once per calendar year) (Yearly, January to December)  Last completed: 2023       HISTORY:  OB History " "   Para Term  AB Living   0 0 0 0 0 0   SAB IAB Ectopic Multiple Live Births   0 0 0 0 0       Patient Active Problem List   Diagnosis   (none) - all problems resolved or deleted     Past Surgical History:   Procedure Laterality Date    HC TOOTH EXTRACTION W/FORCEP        Social History     Tobacco Use    Smoking status: Never    Smokeless tobacco: Never   Substance Use Topics    Alcohol use: Yes     Alcohol/week: 0.0 standard drinks of alcohol     Comment: Occas       Problem (# of Occurrences) Relation (Name,Age of Onset)    No Known Problems (9) Mother, Father, Sister, Brother, Maternal Grandmother, Maternal Grandfather, Paternal Grandmother, Paternal Grandfather, Other              Current Outpatient Medications   Medication Sig Dispense Refill    norethindrone-ethinyl estradiol-iron (JUNEL FE 1.5/30) 1.5-30 MG-MCG tablet TAKE 1 TABLET BY MOUTH ONCE DAILY IN A CONTINUOUS FASHION 112 tablet 3     No current facility-administered medications for this visit.     No Known Allergies    Past medical, surgical, social and family histories were reviewed and updated in EPIC.    EXAM:  /68   Ht 1.702 m (5' 7\")   Wt 64.4 kg (142 lb)   LMP 2024 (Approximate)   BMI 22.24 kg/m     BMI: Body mass index is 22.24 kg/m .    PHYSICAL EXAM:  Constitutional:   Appearance: Well nourished, well developed, alert, in no acute distress  Neck:  Lymph Nodes:  No lymphadenopathy present    Thyroid:  Gland size normal, nontender, no nodules or masses present  on palpation  Chest:  Respiratory Effort:  Breathing unlabored  Cardiovascular:    Heart: Auscultation:  Regular rate, normal rhythm, no murmurs present  Breasts: Inspection of Breasts:  No lymphadenopathy present., Palpation of Breasts and Axillae:  No masses present on palpation, no breast tenderness., Axillary Lymph Nodes:  No lymphadenopathy present., and No nodularity, asymmetry or nipple discharge bilaterally.  Gastrointestinal:   Abdominal " Examination:  Abdomen nontender to palpation, tone normal without rigidity or guarding, no masses present, umbilicus without lesions   Liver and Spleen:  No hepatomegaly present, liver nontender to palpation    Hernias:  No hernias present  Lymphatic: Lymph Nodes:  No other lymphadenopathy present  Skin:  General Inspection:  No rashes present, no lesions present, no areas of  discoloration  Neurologic:    Mental Status:  Oriented X3.  Normal strength and tone, sensory exam                grossly normal, mentation intact and speech normal.    Psychiatric:   Mentation appears normal and affect normal/bright.         Pelvic Exam:  External Genitalia:     Normal appearance for age, no discharge present, no tenderness present, no inflammatory lesions present, color normal  Vagina:     Normal vaginal vault without central or paravaginal defects, no discharge present, no inflammatory lesions present, no masses present  Bladder:     Nontender to palpation  Urethra:   Urethral Body:  Urethra palpation normal, urethra structural support normal   Urethral Meatus:  No erythema or lesions present  Perineum:     Perineum within normal limits, no evidence of trauma, no rashes or skin lesions present  Anus:     Anus within normal limits, no hemorrhoids present  Inguinal Lymph Nodes:     No lymphadenopathy present  Pubic Hair:     Normal pubic hair distribution for age  Genitalia and Groin:     No rashes present, no lesions present, no areas of discoloration, no masses present    COUNSELING:   Reviewed preventive health counseling, as reflected in patient instructions       Regular exercise       Contraception       Advance Care Planning    BMI: Body mass index is 22.24 kg/m .      ASSESSMENT:  24 year old female with satisfactory annual exam.    ICD-10-CM    1. Encounter for gynecological examination without abnormal finding  Z01.419       2. Encounter for surveillance of contraceptive pills  Z30.41       3. Screen for STD  (sexually transmitted disease)  Z11.3       4. Screening for chlamydial disease  Z11.8           PLAN:  Shena presents today for annual gynecologic exam.    Refilled OCPs for continuous use. Okay to continue for another year.     STI Screening  - G/C swab  - Blood Testing (HIV, Syphilis, Hepatitis B, Hepatitis C)    Due for TDAP, last done 2011. Completed today.     Kristen Woodson PA-C

## 2024-06-12 NOTE — PATIENT INSTRUCTIONS
Thank you for visiting the [facility].    We care about your health! Here are some general tips to help you stay as healthy as possible:    - What we eat and drink provides the nutrients we need to keep our bodies working well. Try to eat a variety of foods including lots of vegetables, fruits, whole grains, and proteins. Try to limit foods and beverages with high sodium, trans fats, added sugars, and alcohol.    - Physical activity is beneficial for both your physical and mental health. Aim for at least 2.5 hours of medium-intensity (walking, biking, yoga, housework, etc) or 1.25 hours of high-intensity (running, jump rope, rowing, etc) aerobic physical activity per week in addition to engaging in strengthening activities at least 2 times per week. Start small and work towards a goal. Any activity is good activity!    - If you are not preventing pregnancy, take a folic acid supplement of 0.4 mg/day.    - Calcium helps build and maintain strong bones, muscles, and nerves. Daily total calcium intake (food + supplements) should be 1000mg (equal to 3-4 servings of calcium rich foods such as milk, cheese, yogurt, seafood, leafy green vegetables, edamame, orange juice with added calcium, and some cereals, among others)    - STI testing is always available to you. Please contact the office whenever you would like to update your testing or have a new or potential exposure.    - Follow sexually transmitted infection (STI) prevention tips: talk to all partners about STI testing, use condoms or other barrier methods to protect yourself and others, and get tested for STIs with each new partner.    - Return for a preventative visit every year    ADVANCE DIRECTIVE  We recommend that everyone make an advance directive about their health care goals and update it every 5 years.    When it comes to decisions about your health, it s important that your health care goals, values and wishes are known. In the  event of a sudden injury or illness, you may not be able to communicate your choices. We encourage you to begin by thinking about what matters most to you. Have conversations with family, friends, cultural and/or lesia leaders, and your health care team. Then, share your goals and wishes for current and future healthcare so your voice is heard when treatment decisions need to be made. See the link below for help with talking about and sharing what is most important to you:     [email or web address]    Please do not hesitate to contact the office if you have any questions or concerns.

## 2024-06-13 ENCOUNTER — OFFICE VISIT (OUTPATIENT)
Dept: OBGYN | Facility: CLINIC | Age: 24
End: 2024-06-13
Payer: COMMERCIAL

## 2024-06-13 VITALS
WEIGHT: 142 LBS | DIASTOLIC BLOOD PRESSURE: 68 MMHG | BODY MASS INDEX: 22.29 KG/M2 | HEIGHT: 67 IN | SYSTOLIC BLOOD PRESSURE: 112 MMHG

## 2024-06-13 DIAGNOSIS — Z01.419 ENCOUNTER FOR GYNECOLOGICAL EXAMINATION WITHOUT ABNORMAL FINDING: Primary | ICD-10-CM

## 2024-06-13 DIAGNOSIS — Z11.3 SCREEN FOR STD (SEXUALLY TRANSMITTED DISEASE): ICD-10-CM

## 2024-06-13 DIAGNOSIS — Z30.41 ENCOUNTER FOR SURVEILLANCE OF CONTRACEPTIVE PILLS: ICD-10-CM

## 2024-06-13 DIAGNOSIS — Z11.8 SCREENING FOR CHLAMYDIAL DISEASE: ICD-10-CM

## 2024-06-13 DIAGNOSIS — Z23 NEED FOR TDAP VACCINATION: ICD-10-CM

## 2024-06-13 PROCEDURE — 90715 TDAP VACCINE 7 YRS/> IM: CPT

## 2024-06-13 PROCEDURE — 87340 HEPATITIS B SURFACE AG IA: CPT

## 2024-06-13 PROCEDURE — 99459 PELVIC EXAMINATION: CPT

## 2024-06-13 PROCEDURE — 86780 TREPONEMA PALLIDUM: CPT

## 2024-06-13 PROCEDURE — 36415 COLL VENOUS BLD VENIPUNCTURE: CPT

## 2024-06-13 PROCEDURE — 86803 HEPATITIS C AB TEST: CPT

## 2024-06-13 PROCEDURE — 90471 IMMUNIZATION ADMIN: CPT

## 2024-06-13 PROCEDURE — 87491 CHLMYD TRACH DNA AMP PROBE: CPT

## 2024-06-13 PROCEDURE — 99395 PREV VISIT EST AGE 18-39: CPT | Mod: 25

## 2024-06-13 PROCEDURE — 87591 N.GONORRHOEAE DNA AMP PROB: CPT

## 2024-06-13 PROCEDURE — 87389 HIV-1 AG W/HIV-1&-2 AB AG IA: CPT

## 2024-06-13 RX ORDER — NORETHINDRONE ACETATE AND ETHINYL ESTRADIOL 1.5-30(21)
KIT ORAL
Qty: 112 TABLET | Refills: 3 | Status: SHIPPED | OUTPATIENT
Start: 2024-06-13

## 2024-06-13 ASSESSMENT — ANXIETY QUESTIONNAIRES
GAD7 TOTAL SCORE: 0
7. FEELING AFRAID AS IF SOMETHING AWFUL MIGHT HAPPEN: NOT AT ALL
3. WORRYING TOO MUCH ABOUT DIFFERENT THINGS: NOT AT ALL
5. BEING SO RESTLESS THAT IT IS HARD TO SIT STILL: NOT AT ALL
6. BECOMING EASILY ANNOYED OR IRRITABLE: NOT AT ALL
1. FEELING NERVOUS, ANXIOUS, OR ON EDGE: NOT AT ALL
2. NOT BEING ABLE TO STOP OR CONTROL WORRYING: NOT AT ALL
GAD7 TOTAL SCORE: 0

## 2024-06-13 ASSESSMENT — PATIENT HEALTH QUESTIONNAIRE - PHQ9
SUM OF ALL RESPONSES TO PHQ QUESTIONS 1-9: 0
5. POOR APPETITE OR OVEREATING: NOT AT ALL

## 2024-06-14 LAB
C TRACH DNA SPEC QL NAA+PROBE: NEGATIVE
HBV SURFACE AG SERPL QL IA: NONREACTIVE
HCV AB SERPL QL IA: NONREACTIVE
HIV 1+2 AB+HIV1 P24 AG SERPL QL IA: NONREACTIVE
T PALLIDUM AB SER QL: NONREACTIVE

## 2024-06-18 LAB — N GONORRHOEA DNA SPEC QL NAA+PROBE: NEGATIVE

## 2025-02-04 ENCOUNTER — TELEPHONE (OUTPATIENT)
Dept: OBGYN | Facility: CLINIC | Age: 25
End: 2025-02-04
Payer: COMMERCIAL

## 2025-02-04 NOTE — TELEPHONE ENCOUNTER
Patient has seen MAK Woodson most recently 6/2024  Never mentioned cold sores nor has any testing or positive results for HSV  RN called pt and she explained:  12/2024 1st time had a cold sore - very bad and both her parents have hx of cold sores and they gave her their medication and it helped.  She has never discussed this w any provider before and therefore understands needs a visit to discuss.  However she is living in [place] now  RN suggesting finding a provider there for care and rx.  Pt verbalized understanding, in agreement with plan, and voiced no further questions.    Anabell Mckeon, RN on 2/4/2025 at 2:25 PM

## 2025-02-04 NOTE — TELEPHONE ENCOUNTER
[facility] Call Center    Phone Message    May a detailed message be left on voicemail: yes     Reason for Call: Other: Pt is requesting a RX for cold sores if possible. Pt is wanting a call back regarding this.      Action Taken: Message routed to:  Other: [facility]    Travel Screening: Not Applicable

## 2025-08-02 ENCOUNTER — HEALTH MAINTENANCE LETTER (OUTPATIENT)
Age: 25
End: 2025-08-02